# Patient Record
Sex: FEMALE | Race: WHITE | NOT HISPANIC OR LATINO | URBAN - METROPOLITAN AREA
[De-identification: names, ages, dates, MRNs, and addresses within clinical notes are randomized per-mention and may not be internally consistent; named-entity substitution may affect disease eponyms.]

---

## 2020-06-22 ENCOUNTER — OFFICE (OUTPATIENT)
Dept: URBAN - METROPOLITAN AREA CLINIC 14 | Facility: CLINIC | Age: 60
End: 2020-06-22

## 2020-06-22 VITALS
HEART RATE: 65 BPM | WEIGHT: 223 LBS | SYSTOLIC BLOOD PRESSURE: 143 MMHG | HEIGHT: 67 IN | DIASTOLIC BLOOD PRESSURE: 95 MMHG

## 2020-06-22 DIAGNOSIS — K21.9 GASTRO-ESOPHAGEAL REFLUX DISEASE WITHOUT ESOPHAGITIS: ICD-10-CM

## 2020-06-22 DIAGNOSIS — R10.32 LEFT LOWER QUADRANT PAIN: ICD-10-CM

## 2020-06-22 DIAGNOSIS — R07.2 PRECORDIAL PAIN: ICD-10-CM

## 2020-06-22 DIAGNOSIS — Z86.010 PERSONAL HISTORY OF COLONIC POLYPS: ICD-10-CM

## 2020-06-22 PROCEDURE — 99204 OFFICE O/P NEW MOD 45 MIN: CPT | Performed by: INTERNAL MEDICINE

## 2020-06-22 RX ORDER — SODIUM PICOSULFATE, MAGNESIUM OXIDE, AND ANHYDROUS CITRIC ACID 10; 3.5; 12 MG/160ML; G/160ML; G/160ML
LIQUID ORAL
Qty: 1 | Refills: 0 | Status: COMPLETED
Start: 2020-06-22 | End: 2021-04-22

## 2020-06-22 NOTE — SERVICEHPINOTES
59-year-old white female complaining episodic discomfort in the left lower quadrant for the past 1 year.  Describes it as a sharp pinprick like sensation which only lasts for a few seconds.  Denies any aggravating or relieving factors. No radiation.  Reports that a pelvic ultrasound was normal.  Does not drink sodas or carbonated water.  Gluten seems to bother her so she is on a gluten free diet.  Reports occasional heartburn for which she takes Tums.  Reports occasional tightness in the retrosternal area and reports that cardiac evaluation at [facility] was negative.  Labs done outside in October 2019 revealed normal CBC, liver enzymes, TSH and vitamin-D.  EGD and colonoscopy were performed in June 2011 by Dr. Alan.  A 3 mm tubular adenoma was removed. Random colon biopsies were normal. EGD revealed small hiatal hernia.  Biopsies were negative for Ramirez's esophagus/celiac sprue.  She denies any cardiac issues.  Takes a daily aspirin daily basis.  Not on any diet pills.  Great grandmother with colon cancer.

## 2021-04-22 ENCOUNTER — OFFICE (OUTPATIENT)
Dept: URBAN - METROPOLITAN AREA PATHOLOGY 22 | Facility: PATHOLOGY | Age: 61
End: 2021-04-22
Payer: COMMERCIAL

## 2021-04-22 ENCOUNTER — AMBULATORY SURGICAL CENTER (OUTPATIENT)
Dept: URBAN - METROPOLITAN AREA SURGERY 2 | Facility: SURGERY | Age: 61
End: 2021-04-22

## 2021-04-22 ENCOUNTER — AMBULATORY SURGICAL CENTER (OUTPATIENT)
Dept: URBAN - METROPOLITAN AREA SURGERY 2 | Facility: SURGERY | Age: 61
End: 2021-04-22
Payer: COMMERCIAL

## 2021-04-22 VITALS
HEART RATE: 90 BPM | DIASTOLIC BLOOD PRESSURE: 69 MMHG | TEMPERATURE: 98 F | HEART RATE: 66 BPM | DIASTOLIC BLOOD PRESSURE: 58 MMHG | SYSTOLIC BLOOD PRESSURE: 119 MMHG | RESPIRATION RATE: 14 BRPM | RESPIRATION RATE: 18 BRPM | HEIGHT: 67 IN | HEART RATE: 71 BPM | DIASTOLIC BLOOD PRESSURE: 71 MMHG | HEART RATE: 66 BPM | HEART RATE: 66 BPM | HEIGHT: 67 IN | OXYGEN SATURATION: 95 % | TEMPERATURE: 98 F | SYSTOLIC BLOOD PRESSURE: 119 MMHG | HEART RATE: 61 BPM | DIASTOLIC BLOOD PRESSURE: 59 MMHG | DIASTOLIC BLOOD PRESSURE: 86 MMHG | DIASTOLIC BLOOD PRESSURE: 59 MMHG | WEIGHT: 215 LBS | WEIGHT: 215 LBS | OXYGEN SATURATION: 96 % | DIASTOLIC BLOOD PRESSURE: 86 MMHG | RESPIRATION RATE: 16 BRPM | HEART RATE: 73 BPM | TEMPERATURE: 98 F | HEART RATE: 71 BPM | SYSTOLIC BLOOD PRESSURE: 115 MMHG | HEART RATE: 61 BPM | RESPIRATION RATE: 14 BRPM | DIASTOLIC BLOOD PRESSURE: 71 MMHG | SYSTOLIC BLOOD PRESSURE: 130 MMHG | SYSTOLIC BLOOD PRESSURE: 175 MMHG | RESPIRATION RATE: 16 BRPM | RESPIRATION RATE: 17 BRPM | SYSTOLIC BLOOD PRESSURE: 130 MMHG | RESPIRATION RATE: 17 BRPM | OXYGEN SATURATION: 96 % | DIASTOLIC BLOOD PRESSURE: 58 MMHG | TEMPERATURE: 98.2 F | RESPIRATION RATE: 16 BRPM | DIASTOLIC BLOOD PRESSURE: 69 MMHG | SYSTOLIC BLOOD PRESSURE: 117 MMHG | DIASTOLIC BLOOD PRESSURE: 58 MMHG | OXYGEN SATURATION: 95 % | TEMPERATURE: 98.2 F | HEART RATE: 73 BPM | OXYGEN SATURATION: 95 % | SYSTOLIC BLOOD PRESSURE: 175 MMHG | OXYGEN SATURATION: 96 % | DIASTOLIC BLOOD PRESSURE: 71 MMHG | RESPIRATION RATE: 14 BRPM | DIASTOLIC BLOOD PRESSURE: 59 MMHG | SYSTOLIC BLOOD PRESSURE: 130 MMHG | DIASTOLIC BLOOD PRESSURE: 86 MMHG | RESPIRATION RATE: 17 BRPM | HEART RATE: 61 BPM | SYSTOLIC BLOOD PRESSURE: 117 MMHG | HEART RATE: 73 BPM | RESPIRATION RATE: 18 BRPM | SYSTOLIC BLOOD PRESSURE: 117 MMHG | SYSTOLIC BLOOD PRESSURE: 115 MMHG | HEART RATE: 90 BPM | SYSTOLIC BLOOD PRESSURE: 119 MMHG | SYSTOLIC BLOOD PRESSURE: 115 MMHG | WEIGHT: 215 LBS | SYSTOLIC BLOOD PRESSURE: 175 MMHG | HEIGHT: 67 IN | TEMPERATURE: 98.2 F | RESPIRATION RATE: 18 BRPM | DIASTOLIC BLOOD PRESSURE: 69 MMHG | HEART RATE: 90 BPM | HEART RATE: 71 BPM

## 2021-04-22 DIAGNOSIS — K29.50 UNSPECIFIED CHRONIC GASTRITIS WITHOUT BLEEDING: ICD-10-CM

## 2021-04-22 DIAGNOSIS — K21.9 GASTRO-ESOPHAGEAL REFLUX DISEASE WITHOUT ESOPHAGITIS: ICD-10-CM

## 2021-04-22 DIAGNOSIS — Z86.010 PERSONAL HISTORY OF COLONIC POLYPS: ICD-10-CM

## 2021-04-22 DIAGNOSIS — K44.9 DIAPHRAGMATIC HERNIA WITHOUT OBSTRUCTION OR GANGRENE: ICD-10-CM

## 2021-04-22 DIAGNOSIS — K63.5 POLYP OF COLON: ICD-10-CM

## 2021-04-22 PROBLEM — K31.89 OTHER DISEASES OF STOMACH AND DUODENUM: Status: ACTIVE | Noted: 2021-04-22

## 2021-04-22 PROCEDURE — 88313 SPECIAL STAINS GROUP 2: CPT | Performed by: INTERNAL MEDICINE

## 2021-04-22 PROCEDURE — 43239 EGD BIOPSY SINGLE/MULTIPLE: CPT | Mod: 51 | Performed by: INTERNAL MEDICINE

## 2021-04-22 PROCEDURE — 45380 COLONOSCOPY AND BIOPSY: CPT | Mod: 33 | Performed by: INTERNAL MEDICINE

## 2021-04-22 PROCEDURE — 88305 TISSUE EXAM BY PATHOLOGIST: CPT | Performed by: INTERNAL MEDICINE

## 2021-04-22 PROCEDURE — 88342 IMHCHEM/IMCYTCHM 1ST ANTB: CPT | Performed by: INTERNAL MEDICINE

## 2021-04-22 RX ORDER — OMEPRAZOLE 20 MG/1
20 CAPSULE, DELAYED RELEASE ORAL
Qty: 90 | Refills: 3 | Status: ACTIVE
Start: 2021-04-22

## 2021-08-31 ENCOUNTER — TELEHEALTH PROVIDED OTHER THAN IN PATIENT'S HOME (OUTPATIENT)
Dept: URBAN - METROPOLITAN AREA TELEHEALTH 10 | Facility: TELEHEALTH | Age: 61
End: 2021-08-31

## 2021-08-31 VITALS — HEIGHT: 67 IN

## 2021-08-31 DIAGNOSIS — Z86.010 PERSONAL HISTORY OF COLONIC POLYPS: ICD-10-CM

## 2021-08-31 DIAGNOSIS — K21.9 GASTRO-ESOPHAGEAL REFLUX DISEASE WITHOUT ESOPHAGITIS: ICD-10-CM

## 2021-08-31 DIAGNOSIS — R07.2 PRECORDIAL PAIN: ICD-10-CM

## 2021-08-31 RX ORDER — OMEPRAZOLE 20 MG/1
20 CAPSULE, DELAYED RELEASE ORAL
Qty: 90 | Refills: 3 | Status: ACTIVE
Start: 2021-04-22

## 2021-08-31 NOTE — SERVICEHPINOTES
61-year-old white female here for a Telehealth follow up visit.  Currently taking omeprazole 20 mg on a daily basis and reports good control of reflux.  Previously she was requiring more than 10 Tums on a daily basis.  Quit using NSAIDs.  Remains on a gluten free diet and avoids eggs as well. Reports that bowel movements are regular.  Denies any hematemesis or melena or hematochezia.   passed away unexpectedly in July in 2021 and she is going through the grief process.  Overall she is doing better.  EGD and colonoscopy in April 2021 revealed small hiatal hernia, erythema and erosions in the body of the stomach. Biopsies revealed reactive gastropathy and were negative for H pylori/celiac sprue/eosinophilic esophagitis.  Colonoscopy revealed normal terminal ileum and small internal hemorrhoids.  A non adenomatous polyp was removed.  EGD and colonoscopy were performed in June 2011 by Dr. Alan. A 3 mm tubular adenoma was removed. Random colon biopsies were normal. EGD revealed small hiatal hernia. Biopsies were negative for Ramirez's esophagus/celiac sprue. Great grandmother with colon cancer.

## 2022-03-02 ENCOUNTER — TELEHEALTH PROVIDED OTHER THAN IN PATIENT'S HOME (OUTPATIENT)
Dept: URBAN - METROPOLITAN AREA TELEHEALTH 10 | Facility: TELEHEALTH | Age: 62
End: 2022-03-02

## 2022-03-02 VITALS — HEIGHT: 67 IN

## 2022-03-02 DIAGNOSIS — K21.9 GASTRO-ESOPHAGEAL REFLUX DISEASE WITHOUT ESOPHAGITIS: ICD-10-CM

## 2022-03-02 DIAGNOSIS — R10.32 LEFT LOWER QUADRANT PAIN: ICD-10-CM

## 2022-03-02 RX ORDER — OMEPRAZOLE 20 MG/1
20 CAPSULE, DELAYED RELEASE ORAL
Qty: 90 | Refills: 3 | Status: ACTIVE
Start: 2021-04-22

## 2022-03-02 NOTE — SERVICEHPINOTES
61-year-old white female here for a Telehealth follow up visit. Currently taking omeprazole on a daily basis and reports good control of reflux.  Denies any nausea or vomiting.  Reports that bowel movements are regular.  She is on a gluten free diet.  Reports intermittent discomfort in the left lower quadrant.  Denies any hematemesis or melena or hematochezia.  passed away unexpectedly in July in 2021 and she is going through the grief process.  Overall she is doing better.  EGD and colonoscopy in April 2021 revealed small hiatal hernia, erythema and erosions in the body of the stomach. Biopsies revealed reactive gastropathy and were negative for H pylori/celiac sprue/eosinophilic esophagitis.  Colonoscopy revealed normal terminal ileum and small internal hemorrhoids.  A non adenomatous polyp was removed.  EGD and colonoscopy were performed in June 2011 by Dr. Alan. A 3 mm tubular adenoma was removed. Random colon biopsies were normal. EGD revealed small hiatal hernia. Biopsies were negative for Ramirez's esophagus/celiac sprue. Great grandmother with colon cancer.

## 2022-11-28 ENCOUNTER — TELEHEALTH PROVIDED OTHER THAN IN PATIENT'S HOME (OUTPATIENT)
Dept: URBAN - METROPOLITAN AREA CLINIC 19 | Facility: CLINIC | Age: 62
End: 2022-11-28

## 2022-11-28 VITALS — WEIGHT: 215 LBS | HEIGHT: 67 IN

## 2022-11-28 DIAGNOSIS — R10.32 LEFT LOWER QUADRANT PAIN: ICD-10-CM

## 2022-11-28 DIAGNOSIS — K21.9 GASTRO-ESOPHAGEAL REFLUX DISEASE WITHOUT ESOPHAGITIS: ICD-10-CM

## 2022-11-28 DIAGNOSIS — Z86.010 PERSONAL HISTORY OF COLONIC POLYPS: ICD-10-CM

## 2022-11-28 RX ORDER — OMEPRAZOLE 20 MG/1
20 CAPSULE, DELAYED RELEASE ORAL
Qty: 90 | Refills: 3 | Status: ACTIVE
Start: 2021-04-22

## 2022-11-28 NOTE — SERVICEHPINOTES
62-year-old female here for a Telehealth follow up visit. Currently taking omeprazole 20 mg daily and reports good control of reflux.  Reports only 1 episode of breakthrough symptoms since last clinic visit in March 2022. 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  She reports that the back doctor was concerned that her intermittent left lower quadrant pain was referred pain from her back.  Reports diarrhea whenever she eats gluten containing products accidentally or via cross contamination.  Takes Imodium for this on an as-needed basis.  passed away unexpectedly in July in 2021. EGD and colonoscopy in April 2021 revealed small hiatal hernia, erythema and erosions in the body of the stomach. Biopsies revealed reactive gastropathy and were negative for H pylori/celiac sprue/eosinophilic esophagitis.  Colonoscopy revealed normal terminal ileum and small internal hemorrhoids.  A non adenomatous polyp was removed.  EGD and colonoscopy were performed in June 2011 by Dr. Alan. A 3 mm tubular adenoma was removed. Random colon biopsies were normal. EGD revealed small hiatal hernia. Biopsies were negative for Ramirez's esophagus/celiac sprue. Great grandmother with colon cancer.